# Patient Record
Sex: FEMALE | NOT HISPANIC OR LATINO | URBAN - METROPOLITAN AREA
[De-identification: names, ages, dates, MRNs, and addresses within clinical notes are randomized per-mention and may not be internally consistent; named-entity substitution may affect disease eponyms.]

---

## 2021-11-16 ENCOUNTER — EMERGENCY (EMERGENCY)
Facility: HOSPITAL | Age: 81
LOS: 1 days | Discharge: HOME | End: 2021-11-16
Attending: EMERGENCY MEDICINE | Admitting: EMERGENCY MEDICINE
Payer: MEDICARE

## 2021-11-16 VITALS
OXYGEN SATURATION: 98 % | SYSTOLIC BLOOD PRESSURE: 165 MMHG | RESPIRATION RATE: 16 BRPM | HEART RATE: 92 BPM | DIASTOLIC BLOOD PRESSURE: 81 MMHG | HEIGHT: 64 IN | TEMPERATURE: 98 F | WEIGHT: 110.01 LBS

## 2021-11-16 VITALS
OXYGEN SATURATION: 100 % | DIASTOLIC BLOOD PRESSURE: 61 MMHG | SYSTOLIC BLOOD PRESSURE: 167 MMHG | HEART RATE: 88 BPM | RESPIRATION RATE: 17 BRPM | TEMPERATURE: 98 F

## 2021-11-16 DIAGNOSIS — R51.9 HEADACHE, UNSPECIFIED: ICD-10-CM
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
## 2021-11-16 DIAGNOSIS — S00.03XA CONTUSION OF SCALP, INITIAL ENCOUNTER: ICD-10-CM

## 2021-11-16 DIAGNOSIS — S20.221A CONTUSION OF RIGHT BACK WALL OF THORAX, INITIAL ENCOUNTER: ICD-10-CM

## 2021-11-16 DIAGNOSIS — S09.90XA UNSPECIFIED INJURY OF HEAD, INITIAL ENCOUNTER: ICD-10-CM

## 2021-11-16 DIAGNOSIS — M54.2 CERVICALGIA: ICD-10-CM
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
## 2021-11-16 DIAGNOSIS — Z23 ENCOUNTER FOR IMMUNIZATION: ICD-10-CM

## 2021-11-16 PROCEDURE — 70450 CT HEAD/BRAIN W/O DYE: CPT | Mod: 26,MA

## 2021-11-16 PROCEDURE — 72128 CT CHEST SPINE W/O DYE: CPT | Mod: 26,MA

## 2021-11-16 PROCEDURE — 72128 CT CHEST SPINE W/O DYE: CPT | Mod: MA

## 2021-11-16 PROCEDURE — 90715 TDAP VACCINE 7 YRS/> IM: CPT

## 2021-11-16 PROCEDURE — 90471 IMMUNIZATION ADMIN: CPT

## 2021-11-16 PROCEDURE — 99284 EMERGENCY DEPT VISIT MOD MDM: CPT | Mod: 25

## 2021-11-16 PROCEDURE — 72125 CT NECK SPINE W/O DYE: CPT | Mod: 26,MA

## 2021-11-16 PROCEDURE — 73030 X-RAY EXAM OF SHOULDER: CPT

## 2021-11-16 PROCEDURE — 99285 EMERGENCY DEPT VISIT HI MDM: CPT

## 2021-11-16 PROCEDURE — 70450 CT HEAD/BRAIN W/O DYE: CPT | Mod: MA

## 2021-11-16 PROCEDURE — 73030 X-RAY EXAM OF SHOULDER: CPT | Mod: 26,RT

## 2021-11-16 PROCEDURE — 72125 CT NECK SPINE W/O DYE: CPT | Mod: MA

## 2021-11-16 RX ORDER — TETANUS TOXOID, REDUCED DIPHTHERIA TOXOID AND ACELLULAR PERTUSSIS VACCINE, ADSORBED 5; 2.5; 8; 8; 2.5 [IU]/.5ML; [IU]/.5ML; UG/.5ML; UG/.5ML; UG/.5ML
0.5 SUSPENSION INTRAMUSCULAR ONCE
Refills: 0 | Status: COMPLETED | OUTPATIENT
Start: 2021-11-16 | End: 2021-11-16

## 2021-11-16 RX ADMIN — TETANUS TOXOID, REDUCED DIPHTHERIA TOXOID AND ACELLULAR PERTUSSIS VACCINE, ADSORBED 0.5 MILLILITER(S): 5; 2.5; 8; 8; 2.5 SUSPENSION INTRAMUSCULAR at 03:11

## 2021-11-16 NOTE — ED PROVIDER NOTE - PHYSICAL EXAMINATION
Vitals reviewed  Gen: well appearing elderly F, nad, speaking in full sentences- no dyspnea/hypoxia   Skin: warm, poor turgor  HEENT: + occipital scalp w/ hematoma w/ overlying abrasion and oozing blood, hair matted w/ dried blood, eomi, perrla, mmm  Neck/Back: no midline ttp/step off, small superficial hematoma not expanding to R approx T3 spine, FROM  CV: rrr, no audible m/r/g  Resp: symmetrical expansion, ctab, no w/r/r  Abd: nondistended, soft, nontender, no rebound/guarding  Ext: mild pain w/ ROM R shoulder but not limited, FROM throughout, no peripheral edema, distal pulses 2+, SILT, cap refill intact   Neuro: alert/oriented, no focal deficits, steady gait

## 2021-11-16 NOTE — ED ADULT NURSE NOTE - OBJECTIVE STATEMENT
80y Female a&ox3 from home c/o mechanical fall. Pt reports falling sideways hitting head on corner of Television. No LOC. Not taking blood thinners. Laceration to right forehead noted bleeding minimally. Lac cleaned and dressed. Endorse pain to right shoulder. No ecchymosis or deformity noted. Denies visual changes, numbness, tingling, n/v, nor loss of bladder or bowel control. 80y Female a&ox3 from home c/o mechanical fall. Pt reports falling sideways hitting head on corner of Television. No LOC. Not taking blood thinners. Laceration to right forehead noted bleeding minimally. Lac cleaned and dressed. Endorse pain to right shoulder. No ecchymosis or deformity noted. Denies visual changes, numbness, tingling, n/v, nor loss of bladder or bowel control. Trace edema to lower extremity. Chronic per Pt "It goes away when I put my feel up". 80y Female a&ox3 from home c/o mechanical fall. Pt reports falling sideways hitting head on corner of Television. No LOC. Not taking blood thinners. Laceration to right postoral occipital head bleeding minimally. Lac cleaned and dressed. Endorse pain to right shoulder. No ecchymosis or deformity noted. Denies visual changes, numbness, tingling, n/v, nor loss of bladder or bowel control. Trace edema to lower extremity. Chronic per Pt "It goes away when I put my feel up".

## 2021-11-16 NOTE — ED PROVIDER NOTE - CLINICAL SUMMARY MEDICAL DECISION MAKING FREE TEXT BOX
80 F pmh denies pmh here with daughter s/p mechanical trip and fall with head trauma and R shoulder pain- no loc, no use of AC.  on exam + occipital hematoma w/ abrasion and oozing blood (thoroughly cleaned and no laceration or hemostasis achieved spontaneously), small hematoma to R paraspinal approx T3, no midline spinal ttp, mild pain w/ ROM R shoulder but FROM throughout and NVI.  CT head w/ occipital hematoma, no ICH/fracture.  CT C/T spine shows no fracture/subluxation.  R shoulder xray no fracture/dislocation.  tetanus updated.  pt ambulatory and feeling well.  instructed to f/u with pmd.  discussed strict return parameters

## 2021-11-16 NOTE — ED ADULT TRIAGE NOTE - CHIEF COMPLAINT QUOTE
Pt reports mechanical fall tonight and states she hit posterior head on corner of TV. Pt had posterior head laceration and R shoulder pain. Pt denies any LOC, not taking blood thinners. AOx4.

## 2021-11-16 NOTE — ED ADULT TRIAGE NOTE - BRAND OF COVID-19 VACCINATION
Pt states, \"I bent my right thumb backwards on Friday\". Pt has swelling and bruising noted to right thumb. + CMS noted.
Moderna dose 1 and 2

## 2021-11-16 NOTE — ED PROVIDER NOTE - ATTENDING CONTRIBUTION TO CARE
79 yo female no pmh c/o mechanical fall after tripping while getting up from the cough c/o chi w/o loc, + scalp lac, pain R shoulder.  Pt notes she "tweaked" her back ~ 1 wk ago and notes pain in low back unchanged from prior to fall; no associated numbness/weakness, incontinence.  No cp, sob, abd pain, n/v.  No blood thinners.  Unsure of last td.  Well appearing, nad, nc, + occipital hematoma and lac, lung cta, heart reg, abd soft, nt, ext no gross deformity, R shoulder nontender, no deformity, + from, radial 1+, neck and back nontender midline, + ecchymosis upper thoracic spine, no gross neuro deficits   Pt w chi, scalp lac, shoulder pain w/o apparent bony injury on exam, hematoma on spine w/o sig spinal ttp, c/o unchanged low back pain from prior to fall w/o neuro deficits.  Plan ct head, c/t spine, td, lac repair; reassess. 79 yo female no pmh c/o mechanical fall after tripping while getting up from the cough c/o chi w/o loc, + scalp lac, pain R shoulder.  Pt notes she "tweaked" her back ~ 1 wk ago and notes pain in low back unchanged from prior to fall; no associated numbness/weakness, incontinence.  No cp, sob, abd pain, n/v.  No blood thinners.  Unsure of last td.  Well appearing, nad, nc, + occipital hematoma and abrasion, lung cta, heart reg, abd soft, nt, ext no gross deformity, R shoulder nontender, no deformity, + from, radial 1+, neck and back nontender midline, + ecchymosis upper thoracic spine, no gross neuro deficits   Pt w chi, scalp lac, shoulder pain w/o apparent bony injury on exam, hematoma on spine w/o sig spinal ttp, c/o unchanged low back pain from prior to fall w/o neuro deficits.  Plan ct head, c/t spine, td, wound care; reassess.

## 2021-11-16 NOTE — ED PROVIDER NOTE - NSFOLLOWUPINSTRUCTIONS_ED_ALL_ED_FT
Take tylenol 650mg or motrin 400-800mg as needed every 4-6 hours for pain.   You can apply ice to scalp hematoma to reduce swelling    Closed Head Injury    A closed head injury is an injury to your head that may or may not involve a traumatic brain injury (TBI). Symptoms of TBI can be short or long lasting and include headache, dizziness, interference with memory or speech, fatigue, confusion, changes in sleep, mood changes, nausea, depression/anxiety, and dulling of senses. Make sure to obtain proper rest which includes getting plenty of sleep, avoiding excessive visual stimulation, and avoiding activities that may cause physical or mental stress. Avoid any situation where there is potential for another head injury, including sports.    SEEK IMMEDIATE MEDICAL CARE IF YOU HAVE ANY OF THE FOLLOWING SYMPTOMS: unusual drowsiness, vomiting, severe dizziness, seizures, lightheadedness, muscular weakness, different pupil sizes, visual changes, or clear or bloody discharge from your ears or nose.

## 2021-11-16 NOTE — ED PROVIDER NOTE - OBJECTIVE STATEMENT
80 F pmh denies pmh here with daughter s/p mechanical trip and fall with head trauma.  pt reports going to bathroom when she tripped and fell, hitting back of head and R shoulder on TV- denies loc, no use of AC.  reports bleeding from back of scalp w/ dressing applied by EMS.  also w/ R shoulder pain worse w/ ROM.  Denies f/c, headache, dizziness, fainting, vision changes, chest pain, palpitations, neck/back pain, abd pain, nv, numbness/weakness, pain in LEs or LUE, limited ROM ext, other injuries.  last tetanus unknown

## 2021-11-24 ENCOUNTER — EMERGENCY (EMERGENCY)
Facility: HOSPITAL | Age: 81
LOS: 1 days | Discharge: HOME | End: 2021-11-24
Admitting: EMERGENCY MEDICINE
Payer: MEDICARE

## 2021-11-24 VITALS
DIASTOLIC BLOOD PRESSURE: 95 MMHG | HEIGHT: 64 IN | OXYGEN SATURATION: 98 % | TEMPERATURE: 98 F | RESPIRATION RATE: 18 BRPM | WEIGHT: 111.99 LBS | SYSTOLIC BLOOD PRESSURE: 171 MMHG | HEART RATE: 87 BPM

## 2021-11-24 DIAGNOSIS — M48.56XA COLLAPSED VERTEBRA, NOT ELSEWHERE CLASSIFIED, LUMBAR REGION, INITIAL ENCOUNTER FOR FRACTURE: ICD-10-CM

## 2021-11-24 DIAGNOSIS — M54.50 LOW BACK PAIN, UNSPECIFIED: ICD-10-CM

## 2021-11-24 PROBLEM — Z78.9 OTHER SPECIFIED HEALTH STATUS: Chronic | Status: ACTIVE | Noted: 2021-11-16

## 2021-11-24 PROCEDURE — 99284 EMERGENCY DEPT VISIT MOD MDM: CPT

## 2021-11-24 PROCEDURE — 72131 CT LUMBAR SPINE W/O DYE: CPT | Mod: 26,MG

## 2021-11-24 PROCEDURE — G1004: CPT

## 2021-11-24 PROCEDURE — 99284 EMERGENCY DEPT VISIT MOD MDM: CPT | Mod: 25

## 2021-11-24 PROCEDURE — 72131 CT LUMBAR SPINE W/O DYE: CPT | Mod: MG

## 2021-11-24 RX ORDER — LIDOCAINE 4 G/100G
1 CREAM TOPICAL
Qty: 7 | Refills: 0
Start: 2021-11-24 | End: 2021-11-30

## 2021-11-24 RX ORDER — TRAMADOL HYDROCHLORIDE 50 MG/1
1 TABLET ORAL
Qty: 9 | Refills: 0
Start: 2021-11-24 | End: 2021-11-26

## 2021-11-24 RX ORDER — IBUPROFEN 200 MG
600 TABLET ORAL ONCE
Refills: 0 | Status: COMPLETED | OUTPATIENT
Start: 2021-11-24 | End: 2021-11-24

## 2021-11-24 RX ORDER — LIDOCAINE 4 G/100G
1 CREAM TOPICAL ONCE
Refills: 0 | Status: COMPLETED | OUTPATIENT
Start: 2021-11-24 | End: 2021-11-24

## 2021-11-24 RX ADMIN — Medication 600 MILLIGRAM(S): at 11:27

## 2021-11-24 RX ADMIN — LIDOCAINE 1 PATCH: 4 CREAM TOPICAL at 11:28

## 2021-11-24 NOTE — ED PROVIDER NOTE - OBJECTIVE STATEMENT
The pt is a 79 y/o F, BIBA w/daughter at bedside, c/o L LBP x few wks. Pt states pain is worse w/walking and moving, pain is 6/10, constant and dull, took tyl w/relief - but pain returns when meds wear off. Lives w/ and does not need any help at home. Denies injury/fall, numbness or tingling to toes, loss of bowel or bladder control, inability to walk, saddle paresthesias, n/v/d, abd pain, dysuria, acute changes in symptoms today.

## 2021-11-24 NOTE — ED ADULT NURSE NOTE - CHPI ED NUR SYMPTOMS NEG
no bladder dysfunction/no bowel dysfunction/no constipation/no motor function loss/no neck tenderness/no numbness/no tingling

## 2021-11-24 NOTE — ED ADULT TRIAGE NOTE - CHIEF COMPLAINT QUOTE
Pt c/o lower back pain, worse on the left side, for the past several days. Pt had fall 1.5weeks ago that she was seen at [facility] for, and ambulated out of ER w/o issue. Pain onset several days after now with worsening ability to walk due to pain. Does not radiate into legs. Endorses baseline steady gait, does not use walker/cane at baseline.

## 2021-11-24 NOTE — ED ADULT NURSE NOTE - PRO INTERPRETER NEED 2
Crescentic Complex Repair Preamble Text (Leave Blank If You Do Not Want): Extensive wide undermining was performed. English

## 2021-11-24 NOTE — ED PROVIDER NOTE - NSFOLLOWUPINSTRUCTIONS_ED_ALL_ED_FT
Back Pain  Back pain is very common in adults. The cause of back pain is rarely dangerous and the pain often gets better over time. The cause of your back pain may not be known and may include strain of muscles or ligaments, degeneration of the spinal disks, or arthritis. Occasionally the pain may radiate down your leg(s). Over-the-counter medicines to reduce pain and inflammation are often the most helpful. Stretching and remaining active frequently helps the healing process.   SEEK IMMEDIATE MEDICAL CARE IF YOU HAVE ANY OF THE FOLLOWING SYMPTOMS: bowel or bladder control problems, unusual weakness or numbness in your arms or legs, nausea or vomiting, abdominal pain, fever, dizziness/lightheadedness.   Vertebral Compression Fracture  WHAT YOU NEED TO KNOW:  A vertebral compression fracture (VCF) is a collapse or breakdown in a bone in your spine. Compression fractures happen when there is too much pressure on the vertebra. VCFs most often occur in the thoracic (middle) and lumbar (lower) areas of your spine. Fractures may be mild to severe.  Compression Fracture of Spine  DISCHARGE INSTRUCTIONS:  Call your local emergency number (911 in the [place]) if:   •You feel lightheaded, short of breath, and have chest pain.  •You cough up blood.  •You suddenly cannot feel your legs.  •You suddenly have trouble moving your arms or legs.  Return to the emergency department if:   •Your arm or leg feels warm, tender, and painful. It may look swollen and red.  •You have new problems urinating or having bowel movements.  •You have severe pain in your back after falling, bending forward, sneezing, or coughing strongly.  Call your doctor or orthopedist if:   •You cannot sleep or rest because of back pain.  •You have pain or swelling in your back that is getting worse, or does not go away.  •You have questions or concerns about your condition or care.  Medicines: You may need any of the following:   •NSAIDs, such as ibuprofen, help decrease swelling, pain, and fever. This medicine is available with or without a doctor's order. NSAIDs can cause stomach bleeding or kidney problems in certain people. If you take blood thinner medicine, always ask if NSAIDs are safe for you. Always read the medicine label and follow directions. Do not give these medicines to children under 6 months of age without direction from your child's healthcare provider.  •Acetaminophen decreases pain and fever. It is available without a doctor's order. Ask how much to take and how often to take it. Follow directions. Read the labels of all other medicines you are using to see if they also contain acetaminophen, or ask your doctor or pharmacist. Acetaminophen can cause liver damage if not taken correctly. Do not use more than 4 grams (4,000 milligrams) total of acetaminophen in one day.   •Prescription pain medicine may be given. Ask your healthcare provider how to take this medicine safely. Some prescription pain medicines contain acetaminophen. Do not take other medicines that contain acetaminophen without talking to your healthcare provider. Too much acetaminophen may cause liver damage. Prescription pain medicine may cause constipation. Ask your healthcare provider how to prevent or treat constipation.   •Bisphosphonates and calcitonin may be recommended to help your bones get stronger. They can decrease the pain of a VCF caused by osteoporosis, and decrease your risk for another fracture.  •Take your medicine as directed. Contact your healthcare provider if you think your medicine is not helping or if you have side effects. Tell him or her if you are allergic to any medicine. Keep a list of the medicines, vitamins, and herbs you take. Include the amounts, and when and why you take them. Bring the list or the pill bottles to follow-up visits. Carry your medicine list with you in case of an emergency.  Heat and ice:   •Apply ice on your back for 15 to 20 minutes every hour or as directed. Use an ice pack, or put crushed ice in a plastic bag. Cover it with a towel before you apply it. Ice helps prevent tissue damage and decreases swelling and pain.  •Apply heat on your back for 20 to 30 minutes every 2 hours for as many days as directed. Heat helps decrease pain and muscle spasms.  Activity:   •Avoid activities that may make the pain worse, such as picking up heavy objects. When the pain decreases, begin normal, slow movements as directed by your healthcare provider. Your healthcare provider may have you do weight-bearing exercises such as walking. You may also do non-weight-bearing exercises such as swimming and bicycling.  •You may need to use a walker or cane. Ask your healthcare provider for more information about how to use a cane or a walker.  •When you  objects, bend at the hips and knees. Never bend from the waist only. Use bent knees and your leg muscles as you lift the object. While you lift the object, keep it close to your chest. Try not to twist or lift anything above your waist.  Physical and occupational therapy: Your healthcare provider may recommend you start or continue one or both types of therapy. A physical therapist teaches you exercises to help improve movement and strength, and to decrease pain. An occupational therapist teaches you skills to help with your daily activities.  Manage pain during sleep:   •Do not sleep on a waterbed. Waterbeds do not provide good back support.  •Sleep on a firm mattress. You may also put a ½ to 1-inch piece of plywood between the mattress and box spring.  •Sleep on your back with a pillow under your knees. This will decrease pressure on your back. You may also sleep on your side with 1 or both of your knees bent and a pillow between them. It may also be helpful to sleep on your stomach with a pillow under you at waist level.

## 2021-11-24 NOTE — ED PROVIDER NOTE - MUSCULOSKELETAL, MLM
no spinal tend, no discolorations, + tend over L para lumbar area and tend over L sciatic notch, FROM, LE w/FROM b/l, muscle strength 5/5 b/l, good resistance b/l, pedal pulses 2+ b/l, normal gait

## 2021-11-24 NOTE — ED PROVIDER NOTE - CARE PROVIDER_API CALL
Alisa Galvan)  Orthopaedic Surgery  [address], [address] [address]  Phone: [phone]  Fax: [phone]  Follow Up Time:     Felix Ruiz  ORTHOPAEDIC SURGERY  [address] [place] [address]  Phone: [phone]  Fax: [phone]  Follow Up Time:

## 2021-11-24 NOTE — ED PROVIDER NOTE - PATIENT PORTAL LINK FT
You can access the FollowMyHealth Patient Portal offered by [facility] by registering at the following website: [email or web address]. By joining [facility]’s FollowMyHealth portal, you will also be able to view your health information using other applications (apps) compatible with our system.

## 2021-11-24 NOTE — ED ADULT NURSE NOTE - CHIEF COMPLAINT QUOTE
Pt c/o lower back pain, worse on the left side, for the past several days. Pt had fall 1.5weeks ago that she was seen at [facility] for, and ambulated out of ER w/o issue. Pain onset several days after now with worsening ability to walk due to pain. Does not radiate into legs. Endorses baseline steady gait, does not use walker/cane at baseline.

## 2021-11-24 NOTE — ED ADULT NURSE NOTE - OBJECTIVE STATEMENT
81yo Female C/O Back Pain x Multiple weeks. PT advises the pain is when she is sitting and in her Lower back region. PT was seen in [facility] with Fall 2 weeks prior. PT A&OX4, Stands independently and ambulates with assistance.

## 2021-11-24 NOTE — ED PROVIDER NOTE - CLINICAL SUMMARY MEDICAL DECISION MAKING FREE TEXT BOX
pt c/o atraumatic lbp x few wks, ambulatory w/o assistance, states that gets around home w/o assistance and does not want any help, no bony tend on exam, pt observed to be walking around dept comfortably, given lidoderm patch and ibuprofen w/some relief, ct showed some compression fx and djd, will dc w/pain meds and spine f/u to discuss further tx options, pt understands and agrees w/plan, strict return precautions given